# Patient Record
Sex: FEMALE | Race: WHITE
[De-identification: names, ages, dates, MRNs, and addresses within clinical notes are randomized per-mention and may not be internally consistent; named-entity substitution may affect disease eponyms.]

---

## 2019-09-10 ENCOUNTER — HOSPITAL ENCOUNTER (OUTPATIENT)
Dept: HOSPITAL 19 - COL.CAR | Age: 64
Discharge: HOME | End: 2019-09-10
Attending: INTERNAL MEDICINE
Payer: MEDICARE

## 2019-09-10 VITALS — HEART RATE: 60 BPM | DIASTOLIC BLOOD PRESSURE: 73 MMHG | SYSTOLIC BLOOD PRESSURE: 124 MMHG

## 2019-09-10 VITALS — HEIGHT: 62.99 IN | BODY MASS INDEX: 26.56 KG/M2 | WEIGHT: 149.91 LBS

## 2019-09-10 VITALS — DIASTOLIC BLOOD PRESSURE: 69 MMHG | HEART RATE: 60 BPM | SYSTOLIC BLOOD PRESSURE: 123 MMHG

## 2019-09-10 VITALS — HEART RATE: 66 BPM | DIASTOLIC BLOOD PRESSURE: 65 MMHG | SYSTOLIC BLOOD PRESSURE: 118 MMHG

## 2019-09-10 VITALS — HEART RATE: 63 BPM | DIASTOLIC BLOOD PRESSURE: 67 MMHG | SYSTOLIC BLOOD PRESSURE: 127 MMHG

## 2019-09-10 VITALS — HEART RATE: 63 BPM | DIASTOLIC BLOOD PRESSURE: 70 MMHG | SYSTOLIC BLOOD PRESSURE: 144 MMHG

## 2019-09-10 VITALS — HEART RATE: 66 BPM | DIASTOLIC BLOOD PRESSURE: 77 MMHG | SYSTOLIC BLOOD PRESSURE: 126 MMHG

## 2019-09-10 VITALS — HEART RATE: 62 BPM | DIASTOLIC BLOOD PRESSURE: 78 MMHG | SYSTOLIC BLOOD PRESSURE: 133 MMHG

## 2019-09-10 VITALS — SYSTOLIC BLOOD PRESSURE: 123 MMHG | HEART RATE: 60 BPM | DIASTOLIC BLOOD PRESSURE: 66 MMHG

## 2019-09-10 VITALS — HEART RATE: 66 BPM | SYSTOLIC BLOOD PRESSURE: 128 MMHG | DIASTOLIC BLOOD PRESSURE: 75 MMHG

## 2019-09-10 DIAGNOSIS — Z99.2: ICD-10-CM

## 2019-09-10 DIAGNOSIS — T82.590A: Primary | ICD-10-CM

## 2019-09-10 NOTE — NUR
Reviewed discharge instructions with pt and . Pt verbalizes
understanding.
Pt is awake and alert,
denies any questions, needs at time of departure. moving well, steady gait,
has had no oozing from fistula site.  22g saline lock dc'd, cath intact,
pressure dressing applied.  escorted to exit in a wheelchair,  driving
her home.

## 2019-09-10 NOTE — NUR
Pt back from procedure, report received from Edilberto RN,  pt awake and alert, gcs
17, p,w,d,  bandaid present to fistula, thrill palpated and bruit auscultated.
 pt is on room air.  discharge is pending.   is at bs.

## 2019-09-10 NOTE — NUR
ALL MEDICATIONS GIVEN VORB WITH MD. SEE MERGE FOR ALL MEDICATION ADMIN TIMES.
SEE MERGE FOR ALL RASS ASSESSMENTS DURING AND POST PROCEDURE.

## 2019-09-10 NOTE — NUR
Pt brought med list from july,2019.Per pt she is taking metoprolol not
atenolol.Per pt she is also taking losartan,unknown dose.This nurse contacted
Dr turner office for updated list.Reviewed with pt.Several of home meds on
office list pt reports she is no longer taking.Requested pt to take today's
med list home and compare with her home prescription bottles.Pt verbalizes
understanding.

## 2020-07-02 ENCOUNTER — HOSPITAL ENCOUNTER (OUTPATIENT)
Dept: HOSPITAL 19 - COL.LAB | Age: 65
End: 2020-07-02
Attending: INTERNAL MEDICINE
Payer: MEDICARE

## 2020-07-02 DIAGNOSIS — Z20.828: Primary | ICD-10-CM

## 2021-02-09 ENCOUNTER — HOSPITAL ENCOUNTER (OUTPATIENT)
Dept: HOSPITAL 19 - COL.RAD | Age: 66
End: 2021-02-09
Attending: INTERNAL MEDICINE
Payer: MEDICARE

## 2021-02-09 DIAGNOSIS — D35.1: Primary | ICD-10-CM

## 2021-02-19 NOTE — NUR
SPOKE TO PT AND GAVE INSTRUCTIONS. SHE WILL NOT BE ABLE TO DRINK ALL THE WATER
AS SHE IS A DIALYSIS PT AND WILL HAVE DIALYSIS PRIOR TO COMING IN FOR
PROCEDURE

## 2021-02-24 ENCOUNTER — HOSPITAL ENCOUNTER (OUTPATIENT)
Dept: HOSPITAL 19 - COL.RAD | Age: 66
Discharge: HOME | End: 2021-02-24
Attending: PSYCHIATRY & NEUROLOGY
Payer: MEDICARE

## 2021-02-24 VITALS — HEART RATE: 69 BPM | DIASTOLIC BLOOD PRESSURE: 73 MMHG | SYSTOLIC BLOOD PRESSURE: 147 MMHG

## 2021-02-24 VITALS — BODY MASS INDEX: 26.13 KG/M2 | WEIGHT: 147.49 LBS | HEIGHT: 62.99 IN

## 2021-02-24 VITALS — DIASTOLIC BLOOD PRESSURE: 73 MMHG | SYSTOLIC BLOOD PRESSURE: 144 MMHG | HEART RATE: 69 BPM

## 2021-02-24 VITALS — HEART RATE: 70 BPM | SYSTOLIC BLOOD PRESSURE: 148 MMHG | DIASTOLIC BLOOD PRESSURE: 74 MMHG

## 2021-02-24 VITALS — DIASTOLIC BLOOD PRESSURE: 83 MMHG | SYSTOLIC BLOOD PRESSURE: 143 MMHG | HEART RATE: 72 BPM

## 2021-02-24 VITALS — HEART RATE: 70 BPM | SYSTOLIC BLOOD PRESSURE: 143 MMHG | DIASTOLIC BLOOD PRESSURE: 83 MMHG

## 2021-02-24 VITALS — HEART RATE: 71 BPM | SYSTOLIC BLOOD PRESSURE: 137 MMHG | DIASTOLIC BLOOD PRESSURE: 86 MMHG

## 2021-02-24 VITALS — SYSTOLIC BLOOD PRESSURE: 176 MMHG | HEART RATE: 76 BPM | DIASTOLIC BLOOD PRESSURE: 87 MMHG

## 2021-02-24 DIAGNOSIS — G37.9: Primary | ICD-10-CM

## 2021-03-01 LAB
APPEARANCE CSF: CLEAR
COLOR CSF: COLORLESS
GLUCOSE CSF-MCNC: 46 MG/DL (ref 40–70)
GRANULOCYTES NFR CSF MANUAL: 3 % (ref 0–6)
MONOCYTES NFR CSF MANUAL: 97 % (ref 70–100)
PROT CSF-MCNC: 68 MG/DL (ref 15–45)
RBC # CSF: 77 /MM3 (ref 0–0)

## 2021-03-02 ENCOUNTER — HOSPITAL ENCOUNTER (OUTPATIENT)
Dept: HOSPITAL 19 - COL.CAR | Age: 66
Discharge: HOME | End: 2021-03-02
Attending: INTERNAL MEDICINE
Payer: MEDICARE

## 2021-03-02 VITALS — SYSTOLIC BLOOD PRESSURE: 143 MMHG | HEART RATE: 62 BPM | DIASTOLIC BLOOD PRESSURE: 77 MMHG

## 2021-03-02 VITALS — HEART RATE: 59 BPM | SYSTOLIC BLOOD PRESSURE: 111 MMHG | DIASTOLIC BLOOD PRESSURE: 60 MMHG

## 2021-03-02 VITALS — WEIGHT: 146.39 LBS | HEIGHT: 62.99 IN | BODY MASS INDEX: 25.94 KG/M2

## 2021-03-02 VITALS — DIASTOLIC BLOOD PRESSURE: 67 MMHG | SYSTOLIC BLOOD PRESSURE: 122 MMHG | HEART RATE: 96 BPM

## 2021-03-02 VITALS — HEART RATE: 58 BPM | SYSTOLIC BLOOD PRESSURE: 120 MMHG | DIASTOLIC BLOOD PRESSURE: 72 MMHG | TEMPERATURE: 98.6 F

## 2021-03-02 VITALS — HEART RATE: 61 BPM | SYSTOLIC BLOOD PRESSURE: 134 MMHG | DIASTOLIC BLOOD PRESSURE: 65 MMHG

## 2021-03-02 VITALS — SYSTOLIC BLOOD PRESSURE: 129 MMHG | DIASTOLIC BLOOD PRESSURE: 60 MMHG | HEART RATE: 64 BPM

## 2021-03-02 VITALS — HEART RATE: 67 BPM | DIASTOLIC BLOOD PRESSURE: 65 MMHG | SYSTOLIC BLOOD PRESSURE: 131 MMHG

## 2021-03-02 VITALS — DIASTOLIC BLOOD PRESSURE: 67 MMHG | HEART RATE: 67 BPM | SYSTOLIC BLOOD PRESSURE: 119 MMHG

## 2021-03-02 DIAGNOSIS — I12.0: ICD-10-CM

## 2021-03-02 DIAGNOSIS — N18.6: ICD-10-CM

## 2021-03-02 DIAGNOSIS — T82.898A: Primary | ICD-10-CM

## 2021-03-02 DIAGNOSIS — Z79.899: ICD-10-CM

## 2021-03-02 DIAGNOSIS — Z99.2: ICD-10-CM

## 2021-03-02 DIAGNOSIS — Z20.822: ICD-10-CM

## 2021-03-02 PROCEDURE — C1769 GUIDE WIRE: HCPCS

## 2021-03-02 NOTE — NUR
Pt ready to go home, puncture to fistula remains covered with clean dry and
intact bandaid, thrill palpable, cms intact to lue.
Pt  denies any questions about dc instructions, IV is dc'd with cath intact,
dressing was applied.  I ambulated with pt to exit where her  was
waiting.

## 2021-03-03 LAB
ALBUMIN CSF-MCNC: (no result) MG/DL
ALBUMIN SERPL-MCNC: (no result) G/DL
IGG CSF-MCNC: (no result) MG/DL
IGG SERPL-MCNC: (no result) MG/DL
IGG SYNTH RATE SER+CSF CALC-MRATE: (no result) MG/(24.H)
IGG/ALB CLEAR SER+CSF-RTO: (no result)
IGG/ALB CSF: (no result) {RATIO}
IGG/ALB SER: (no result) {RATIO}
OLIGOCLONAL BANDS CSF ELPH-IMP: (no result)
OLIGOCLONAL BANDS SERPL: (no result)